# Patient Record
Sex: FEMALE | NOT HISPANIC OR LATINO | URBAN - METROPOLITAN AREA
[De-identification: names, ages, dates, MRNs, and addresses within clinical notes are randomized per-mention and may not be internally consistent; named-entity substitution may affect disease eponyms.]

---

## 2024-01-26 ENCOUNTER — TELEPHONE (OUTPATIENT)
Dept: OBSTETRICS AND GYNECOLOGY | Facility: CLINIC | Age: 64
End: 2024-01-26

## 2024-03-18 ENCOUNTER — OFFICE VISIT (OUTPATIENT)
Dept: OBSTETRICS AND GYNECOLOGY | Facility: CLINIC | Age: 64
End: 2024-03-18
Payer: COMMERCIAL

## 2024-03-18 VITALS
DIASTOLIC BLOOD PRESSURE: 68 MMHG | SYSTOLIC BLOOD PRESSURE: 122 MMHG | HEIGHT: 63 IN | WEIGHT: 134.8 LBS | BODY MASS INDEX: 23.88 KG/M2

## 2024-03-18 DIAGNOSIS — Z71.89 COUNSELING FOR HORMONE REPLACEMENT THERAPY: ICD-10-CM

## 2024-03-18 DIAGNOSIS — Z12.31 ENCOUNTER FOR SCREENING MAMMOGRAM FOR MALIGNANT NEOPLASM OF BREAST: ICD-10-CM

## 2024-03-18 DIAGNOSIS — N39.3 PRIMARY STRESS URINARY INCONTINENCE: ICD-10-CM

## 2024-03-18 DIAGNOSIS — M89.9 BONE/CARTILAGE DISORDER: ICD-10-CM

## 2024-03-18 DIAGNOSIS — Z71.6 TOBACCO ABUSE COUNSELING: ICD-10-CM

## 2024-03-18 DIAGNOSIS — Z01.419 WELL WOMAN EXAM: Primary | ICD-10-CM

## 2024-03-18 DIAGNOSIS — M94.9 BONE/CARTILAGE DISORDER: ICD-10-CM

## 2024-03-18 DIAGNOSIS — Z12.11 COLON CANCER SCREENING: ICD-10-CM

## 2024-03-18 DIAGNOSIS — Z13.820 OSTEOPOROSIS SCREENING: ICD-10-CM

## 2024-03-18 PROCEDURE — 1159F MED LIST DOCD IN RCRD: CPT | Performed by: OBSTETRICS & GYNECOLOGY

## 2024-03-18 PROCEDURE — 1126F AMNT PAIN NOTED NONE PRSNT: CPT | Performed by: OBSTETRICS & GYNECOLOGY

## 2024-03-18 PROCEDURE — 99397 PER PM REEVAL EST PAT 65+ YR: CPT | Performed by: OBSTETRICS & GYNECOLOGY

## 2024-03-18 RX ORDER — BISMUTH SUBSALICYLATE 262 MG
1 TABLET,CHEWABLE ORAL DAILY
COMMUNITY

## 2024-03-18 RX ORDER — ESTRADIOL 1 MG/1
1 TABLET ORAL DAILY
COMMUNITY

## 2024-03-18 RX ORDER — MEDROXYPROGESTERONE ACETATE 2.5 MG/1
1 TABLET ORAL DAILY
COMMUNITY
Start: 2009-11-24

## 2024-03-18 RX ORDER — LISINOPRIL 20 MG/1
20 TABLET ORAL DAILY
COMMUNITY

## 2024-03-18 ASSESSMENT — PATIENT HEALTH QUESTIONNAIRE - PHQ9
SUM OF ALL RESPONSES TO PHQ9 QUESTIONS 1 & 2: 0
1. LITTLE INTEREST OR PLEASURE IN DOING THINGS: NOT AT ALL
2. FEELING DOWN, DEPRESSED OR HOPELESS: NOT AT ALL

## 2024-03-18 ASSESSMENT — LIFESTYLE VARIABLES
HOW OFTEN DO YOU HAVE SIX OR MORE DRINKS ON ONE OCCASION: NEVER
HOW OFTEN DO YOU HAVE A DRINK CONTAINING ALCOHOL: 2-4 TIMES A MONTH
AUDIT-C TOTAL SCORE: 2
SKIP TO QUESTIONS 9-10: 1
HOW MANY STANDARD DRINKS CONTAINING ALCOHOL DO YOU HAVE ON A TYPICAL DAY: 1 OR 2

## 2024-03-18 ASSESSMENT — ENCOUNTER SYMPTOMS
DEPRESSION: 0
LOSS OF SENSATION IN FEET: 0
OCCASIONAL FEELINGS OF UNSTEADINESS: 0

## 2024-03-18 ASSESSMENT — PAIN SCALES - GENERAL: PAINLEVEL: 0-NO PAIN

## 2024-03-18 NOTE — PROGRESS NOTES
ESTABLISHED ANNUAL GYN VISIT     Patient Name:  Negra Díaz  :  1960  MR #:  [id number]  Acct #:  [id number]      ASSESSMENT/PLAN:     1. Well woman exam      2. Encounter for screening mammogram for malignant neoplasm of breast    - BI mammo bilateral screening tomosynthesis; Future    3. Bone/cartilage disorder    - XR DEXA bone density; Future    4. Osteoporosis screening    - XR DEXA bone density; Future    5. Counseling for hormone replacement therapy      6. Tobacco abuse counseling    - XR DEXA bone density; Future    7. Primary stress urinary incontinence      8. Colon cancer screening       .All questions answered.  Breast self exam technique reviewed and patient encouraged to perform self-exam monthly.  Discussed healthy lifestyle modifications.  Educational material distributed.  Follow up as needed.  Mammogram.    Counseling:  Medication education:  Education:  All new and/or current medications discussed and reviewed including side effects with patient/caregiver, Understanding:  Caregiver/Patient expressed understanding., Adherence:  Barriers to adherence identified and discussed if present,   -Wean of HRT. Reviewed alternatives  OB/GYN Preventive:     - Pap smear is not indicated in women over the age of 65 unless there is history of cervical or vaginal cancer, or other applicable high risk status.    - Self breast exam monthly and clinical breast examination yearly discussed - Next Mammogram due Now  - Screening colonoscopy recommended starting age 45, then Q3-10 years depending on testing and family history - Next screen due 72-73 yoa. Up to date.   - Osteoporosis prevention discussion included vit d3/calcium supplements, weight-bearing exercise - DEXA scan due Now  - Genitourinary skin hygiene discussed.  - Diet/Weight management discussed.      Chief Complaint:  Annual exam    HPI:  Negra Díaz is a 63 y.o.  female who presents for pelvic and breast exam.  She has UI with stress.  She  "has been on HRT.  She wishes to continue. She is symptomatic without it.  She has not tried alternatives.    She is a smoker.     GYNH:   MENARCHE:  12  Hx of sexual activity Yes - . Coitarche Yes - .      Past Medical History:   Diagnosis Date    Hypertension        History reviewed. No pertinent surgical history.    Social History     Tobacco Use    Smoking status: Every Day     Packs/day: 1     Types: Cigarettes    Smokeless tobacco: Never   Vaping Use    Vaping Use: Never used   Substance Use Topics    Alcohol use: Yes    Drug use: Never        Family History   Problem Relation Name Age of Onset    Stomach cancer Mother      Throat cancer Father         OB History          2    Para   0    Term   0       0    AB   2    Living   0         SAB   0    IAB   0    Ectopic   2    Multiple   0    Live Births   0                  Prior to Admission medications    Medication Sig Start Date End Date Taking? Authorizing Provider   estradiol (Estrace) 1 mg tablet Take 1 tablet (1 mg) by mouth once daily.   Yes Historical Provider, MD   lisinopril 20 mg tablet Take 1 tablet (20 mg) by mouth once daily.   Yes Historical Provider, MD   medroxyPROGESTERone (Provera) 2.5 mg tablet Take 1 tablet (2.5 mg) by mouth once daily. 09  Yes Historical Provider, MD   multivitamin tablet Take 1 tablet by mouth once daily.   Yes Historical Provider, MD       No Known Allergies      ROS:   [facility] - WOMEN ONLY:          Breast Lump No acute changes.  Hot Flashes yes, Night sweats yes.  Painful Uncertain no.  Vaginal Discharge no.     [facility] - ROS Update:          Unexplained Weight Change no.  Pain anywhere in your body no.  Black or bloody stools no.  Problems with urination no.  Rashes or sores no.  Sexual problems no.  Depression or anxiety problems no.  Do you feel threatened by anyone No.      OBJECTIVE:   /68   Ht 1.6 m (5' 3\")   Wt 61.1 kg (134 lb 12.8 oz)   BMI 23.88 kg/m²   Body mass index is 23.88 kg/m². "     Physical Exam  GENERAL:   General Appearance:  well-developed, well-nourished, no functional handicap, well-groomed.  Appears older than stated age. Hygiene:  good.  Ill-appearance:  none.  Mental Status:  alert and oriented. Speech:  clear.  Eye contact:  normal.  Appears stated age:  yes.     LUNGS:  Effort:  no respiratory distress.  +cough with deep inspiration  HEART:  HRRR with S1/S2 w/o M/C/R  BREASTS: General:  no masses, no tenderness, no skin changes, no nipple abnormality, and no axillary lymphadenopathy.   ABDOMEN: Tenderness:  none.  Distention:  none.   GENITOURINARY - FEMALE: Bladder: +dribble with cough and purposeful valsalva.  Pelvic support defects:  not seen .  External genitalia:  Normal.  Urethra:  Normal.  Vagina:  minimal discharge; mild hypoestrogenic change, dry.  Uterus:  normal size/shape/consistency, non tender.  Adnexa:  normal , non tender.   DERMATOLOGY:  Skin:  aging; chronic changes of sun exposure.    EXTREMITIES: Normal:  no anomalies.  Edema:  none.    NEUROLOGICAL: Orientation:  alert and oriented x 3.   PSYCHOLOGY: Affect:  appropriate.  Mood:  pleasant.    Labs reviewed: Yes -     Imaging reviewed:  Yes -     Note: This dictation was generated using Dragon voice recognition software. Please excuse any grammatical or spelling errors that may have occurred using the system.

## 2024-04-24 ENCOUNTER — HOSPITAL ENCOUNTER (OUTPATIENT)
Dept: RADIOLOGY | Facility: HOSPITAL | Age: 64
Discharge: HOME | End: 2024-04-24
Payer: COMMERCIAL

## 2024-04-24 VITALS — HEIGHT: 63 IN | WEIGHT: 120 LBS | BODY MASS INDEX: 21.26 KG/M2

## 2024-04-24 DIAGNOSIS — M94.9 BONE/CARTILAGE DISORDER: ICD-10-CM

## 2024-04-24 DIAGNOSIS — Z13.820 OSTEOPOROSIS SCREENING: ICD-10-CM

## 2024-04-24 DIAGNOSIS — M89.9 BONE/CARTILAGE DISORDER: ICD-10-CM

## 2024-04-24 DIAGNOSIS — Z12.31 ENCOUNTER FOR SCREENING MAMMOGRAM FOR MALIGNANT NEOPLASM OF BREAST: ICD-10-CM

## 2024-04-24 DIAGNOSIS — Z71.6 TOBACCO ABUSE COUNSELING: ICD-10-CM

## 2024-04-24 PROCEDURE — 77080 DXA BONE DENSITY AXIAL: CPT

## 2024-04-24 PROCEDURE — 77080 DXA BONE DENSITY AXIAL: CPT | Performed by: RADIOLOGY

## 2024-04-24 PROCEDURE — 77063 BREAST TOMOSYNTHESIS BI: CPT | Performed by: RADIOLOGY

## 2024-04-24 PROCEDURE — 77067 SCR MAMMO BI INCL CAD: CPT

## 2024-04-24 PROCEDURE — 77067 SCR MAMMO BI INCL CAD: CPT | Performed by: RADIOLOGY
